# Patient Record
Sex: FEMALE | Race: WHITE | Employment: OTHER | URBAN - METROPOLITAN AREA
[De-identification: names, ages, dates, MRNs, and addresses within clinical notes are randomized per-mention and may not be internally consistent; named-entity substitution may affect disease eponyms.]

---

## 2017-01-25 ENCOUNTER — TELEPHONE (OUTPATIENT)
Dept: FAMILY MEDICINE CLINIC | Facility: CLINIC | Age: 67
End: 2017-01-25

## 2017-01-29 ENCOUNTER — PATIENT MESSAGE (OUTPATIENT)
Dept: FAMILY MEDICINE CLINIC | Facility: CLINIC | Age: 67
End: 2017-01-29

## 2017-01-30 RX ORDER — LORAZEPAM 0.5 MG/1
TABLET ORAL
Qty: 90 TABLET | Refills: 1 | Status: SHIPPED | OUTPATIENT
Start: 2017-01-30 | End: 2017-03-12

## 2017-01-30 NOTE — TELEPHONE ENCOUNTER
Dr. Lyubov Zhao. Please advise, if you are ok with this we should be sending it to a local pharmacy and then pt can have it transferred to [facility] in [place]. I did not pend it to you for this reason.

## 2017-01-30 NOTE — TELEPHONE ENCOUNTER
From: Kayla Whitley  To: Lesa Mehta DO  Sent: 1/29/2017 6:28 PM CST  Subject: Other    Hi Dr Alvin Prince,  I need a prescription refill for Lorazepam 0.5 mg tablets. I need a 90 day supply.  I am in [place] and will not return to [facility] until sometim

## 2017-02-07 ENCOUNTER — TELEPHONE (OUTPATIENT)
Dept: FAMILY MEDICINE CLINIC | Facility: CLINIC | Age: 67
End: 2017-02-07

## 2017-03-10 RX ORDER — ALENDRONATE SODIUM 70 MG/1
TABLET ORAL
Qty: 12 TABLET | Refills: 2 | Status: SHIPPED | OUTPATIENT
Start: 2017-03-10 | End: 2017-10-10

## 2017-03-13 RX ORDER — LORAZEPAM 0.5 MG/1
TABLET ORAL
Qty: 30 TABLET | Refills: 2 | Status: SHIPPED | OUTPATIENT
Start: 2017-03-13 | End: 2017-04-18 | Stop reason: SDUPTHER

## 2017-03-13 RX ORDER — TEMAZEPAM 7.5 MG/1
CAPSULE ORAL
Qty: 30 CAPSULE | Refills: 2 | Status: SHIPPED | OUTPATIENT
Start: 2017-03-13 | End: 2017-06-09 | Stop reason: SDUPTHER

## 2017-04-18 ENCOUNTER — TELEPHONE (OUTPATIENT)
Dept: FAMILY MEDICINE CLINIC | Facility: CLINIC | Age: 67
End: 2017-04-18

## 2017-04-18 NOTE — TELEPHONE ENCOUNTER
Message received from [facility] Referrals department today: Please see message below and advise if okay to refer.     Reason for the order/referral: OV   PCP:  Dr Leatha Zee   Refer to Provider (first and last name):Trudy Fulton   Specialty: Derm   Patient Ins

## 2017-04-18 NOTE — PROGRESS NOTES
Moy Casas is a 75year old female. HPI:   Patient is here for follow-up on her weight. Her  states that her appetite is okay. He does have to help her eat. She is down a few pounds since the last time she was seen in the office.  Her husba ALENDRONATE SODIUM 70 MG Oral Tab TAKE 1 TABLET(70 MG) BY MOUTH 1 TIME A WEEK Disp: 12 tablet Rfl: 2   NAMENDA XR 28 MG Oral Capsule SR 24 Hr TAKE 1 CAPSULE BY MOUTH ONCE DAILY Disp: 90 capsule Rfl: 0        Megestrol               Rash   Past Medical Hi or edema  Musculoskeletal: Patient is able to raise her right hand, or her right leg, left hand, or left leg on command  NEURO: Patient does try to engage in conversation when asked questions, however, she is unable to answer more than one word responses.

## 2017-04-25 ENCOUNTER — HOSPITAL ENCOUNTER (OUTPATIENT)
Dept: MAMMOGRAPHY | Age: 67
Discharge: HOME | End: 2017-04-25
Attending: FAMILY MEDICINE
Payer: MEDICARE

## 2017-04-25 ENCOUNTER — TELEPHONE (OUTPATIENT)
Dept: FAMILY MEDICINE CLINIC | Facility: CLINIC | Age: 67
End: 2017-04-25

## 2017-04-25 DIAGNOSIS — Z12.31 ENCOUNTER FOR SCREENING MAMMOGRAM FOR MALIGNANT NEOPLASM OF BREAST: ICD-10-CM

## 2017-04-25 PROCEDURE — 77067 SCR MAMMO BI INCL CAD: CPT

## 2017-05-08 ENCOUNTER — PATIENT MESSAGE (OUTPATIENT)
Dept: FAMILY MEDICINE CLINIC | Facility: CLINIC | Age: 67
End: 2017-05-08

## 2017-05-08 NOTE — TELEPHONE ENCOUNTER
Patient to try: lorazepam 0.5mg in am, 1 mg in afternoon and 0.5mg in the evening give update in 1 week, per Dr. Meagan Valadez. Information given to (per hippa), understanding verbalized.

## 2017-05-24 ENCOUNTER — LAB ENCOUNTER (OUTPATIENT)
Dept: DERMATOLOGY CLINIC | Age: 67
End: 2017-05-24
Attending: PHYSICIAN ASSISTANT
Payer: MEDICARE

## 2017-05-24 DIAGNOSIS — Z76.89 ENCOUNTER FOR BIOPSY: Primary | ICD-10-CM

## 2017-05-24 PROCEDURE — 88342 IMHCHEM/IMCYTCHM 1ST ANTB: CPT

## 2017-05-24 PROCEDURE — 88305 TISSUE EXAM BY PATHOLOGIST: CPT

## 2017-06-05 ENCOUNTER — PATIENT MESSAGE (OUTPATIENT)
Dept: FAMILY MEDICINE CLINIC | Facility: CLINIC | Age: 67
End: 2017-06-05

## 2017-06-06 NOTE — TELEPHONE ENCOUNTER
From: Calvin Reid  To: Viviane Noel DO  Sent: 6/5/2017 10:37 AM CDT  Subject: Other    Hi Dr Seferino Sepulveda,  I need you to write a letter, stating that Henry Feng suffers from Alzheimer's. Write it on your letterhead and email it to me.  I need this letter to e

## 2017-06-09 ENCOUNTER — TELEPHONE (OUTPATIENT)
Dept: FAMILY MEDICINE CLINIC | Facility: CLINIC | Age: 67
End: 2017-06-09

## 2017-06-09 RX ORDER — LORAZEPAM 0.5 MG/1
TABLET ORAL
Qty: 120 TABLET | Refills: 2 | Status: SHIPPED | OUTPATIENT
Start: 2017-06-09 | End: 2019-04-23

## 2017-06-13 NOTE — TELEPHONE ENCOUNTER
Approval received from [facility] [employer]. Coverage good until 6/11/18. Called to pt reaches  who is not listed on HIPPA.    became very upset that he was not on HIPPA, stated that he \"fills it out every year and it never gets updated in her chart, my

## 2017-06-22 ENCOUNTER — PATIENT MESSAGE (OUTPATIENT)
Dept: FAMILY MEDICINE CLINIC | Facility: CLINIC | Age: 67
End: 2017-06-22

## 2017-06-29 ENCOUNTER — TELEPHONE (OUTPATIENT)
Dept: FAMILY MEDICINE CLINIC | Facility: CLINIC | Age: 67
End: 2017-06-29

## 2017-06-29 DIAGNOSIS — L98.9 FIBROHISTIOCYTIC PROLIFERATION OF THE SKIN: Primary | ICD-10-CM

## 2017-06-29 NOTE — TELEPHONE ENCOUNTER
Msg received today from [facility] Referrals today:  Referral to Dr. Amairani Moreira, Dermatologist (INTERNAL)    Patient Name: Stu Morales   : 48   Reason for the order/referral: F/U   PCP: Lucinda Davenport DO   Refer to Provider (first and last na

## 2017-07-07 ENCOUNTER — MA CHART PREP (OUTPATIENT)
Dept: FAMILY MEDICINE CLINIC | Facility: CLINIC | Age: 67
End: 2017-07-07

## 2017-07-09 NOTE — PROGRESS NOTES
HPI:   Tong York is a 75year old female who presents for a MA Supervisit. Pt. Denies complaints. Lorazepam and quetiapine. In am 8 am  Lorazepam between 2- and 7 pm. Quetipine sometimes used. Temazepam evening.   Paroxetine at 1 or 2 pm.    L SLP   QUEtiapine Fumarate 25 MG Oral Tab Take 1 tablet (25 mg total) by mouth 4 (four) times daily. PARoxetine HCl 20 MG Oral Tab Take 1 tablet (20 mg total) by mouth daily.    LORazepam 0.5 MG Oral Tab Take 1 tablet (0.5mg) by mouth in amTake 2 tablets ( incontinence   MUSCULOSKELETAL: denies back pain  NEURO: denies headaches  PSYCHE: denies depression or anxiety  HEMATOLOGIC: denies hx of anemia  ENDOCRINE:  thyroid history  ALL/ASTHMA: denies hx of allergy or asthma    EXAM:   /62 (BP Location: Ri Pulses: 2+ and symmetric   Skin: Skin color, texture, turgor normal, no rashes; SK on Back; AK on left mib back and red spot on left lateral back around T6 and AK on left ant. leg    Lymph nodes: Cervical, supraclavicular, and axillary nodes normal   Kimberly COMP METABOLIC PANEL (14); Future    Depression screening  -     DEPRESSION SCREEN ANNUAL    Need for hepatitis C screening test  -     HCV ANTIBODY;  Future    History of basal cell cancer     Patient Active Problem List:     Pure hypercholesterolemia been poor?: Yes    How does the patient maintain a good energy level?: Daily Walks    How would you describe your daily physical activity?: Moderate ( is sure to keep her active)    How would you describe your current health state?: Good    How do y LAST 2 WEEKS   Little interest or pleasure in doing things (over the last two weeks)?: More than half the days    Feeling down, depressed, or hopeless (over the last two weeks)?: More than half the days    PHQ-2 SCORE: 4        Advance Directives     Do yo No flowsheet data found. Bone Density Screening      Dexascan Every two years  No flowsheet data found.        Results for orders placed or performed during the hospital encounter of 04/06/16  -XR DEXA BONE DENSITOMETRY (CPT=77080)        Narrative   PRO display for this patient. Update Health Maintenance if applicable    Chlamydia  Annually if high risk No results found for: CHLAMYDIA No flowsheet data found.     Screening Mammogram      Mammogram Annually to 76, then as discussed Mammogram,1 Yr due on 04/ Conc  Annually No results found for: DIGOXIN, DIG, VALP No flowsheet data found. Diabetes      HgbA1C  Annually No results found for: A1C No flowsheet data found.     Creat/alb ratio  Annually      LDL  Annually LDL-CHOLESTEROL (mg/dL (calc))   Date Valu

## 2017-07-10 ENCOUNTER — OFFICE VISIT (OUTPATIENT)
Dept: FAMILY MEDICINE CLINIC | Facility: CLINIC | Age: 67
End: 2017-07-10

## 2017-07-10 VITALS
DIASTOLIC BLOOD PRESSURE: 62 MMHG | BODY MASS INDEX: 19.4 KG/M2 | SYSTOLIC BLOOD PRESSURE: 100 MMHG | HEART RATE: 60 BPM | WEIGHT: 109.5 LBS | RESPIRATION RATE: 14 BRPM | HEIGHT: 63 IN

## 2017-07-10 DIAGNOSIS — N95.1 SYMPTOMATIC MENOPAUSAL OR FEMALE CLIMACTERIC STATES: ICD-10-CM

## 2017-07-10 DIAGNOSIS — Z85.828 HISTORY OF BASAL CELL CANCER: ICD-10-CM

## 2017-07-10 DIAGNOSIS — N60.19 DIFFUSE CYSTIC MASTOPATHY, UNSPECIFIED LATERALITY: ICD-10-CM

## 2017-07-10 DIAGNOSIS — M81.0 OSTEOPOROSIS, UNSPECIFIED OSTEOPOROSIS TYPE, UNSPECIFIED PATHOLOGICAL FRACTURE PRESENCE: ICD-10-CM

## 2017-07-10 DIAGNOSIS — Z79.899 MEDICATION MANAGEMENT: ICD-10-CM

## 2017-07-10 DIAGNOSIS — L57.0 AK (ACTINIC KERATOSIS): ICD-10-CM

## 2017-07-10 DIAGNOSIS — D18.01 CHERRY HEMANGIOMA: ICD-10-CM

## 2017-07-10 DIAGNOSIS — F02.81 LATE ONSET ALZHEIMER'S DISEASE WITH BEHAVIORAL DISTURBANCE (HCC): ICD-10-CM

## 2017-07-10 DIAGNOSIS — Z11.59 NEED FOR HEPATITIS C SCREENING TEST: ICD-10-CM

## 2017-07-10 DIAGNOSIS — E78.00 PURE HYPERCHOLESTEROLEMIA: ICD-10-CM

## 2017-07-10 DIAGNOSIS — L82.1 OTHER SEBORRHEIC KERATOSIS: ICD-10-CM

## 2017-07-10 DIAGNOSIS — Z85.72 HISTORY OF NON-HODGKIN'S LYMPHOMA: ICD-10-CM

## 2017-07-10 DIAGNOSIS — G47.01 INSOMNIA DUE TO MEDICAL CONDITION: ICD-10-CM

## 2017-07-10 DIAGNOSIS — Z00.00 ENCOUNTER FOR ANNUAL HEALTH EXAMINATION: Primary | ICD-10-CM

## 2017-07-10 DIAGNOSIS — Z13.31 DEPRESSION SCREENING: ICD-10-CM

## 2017-07-10 DIAGNOSIS — E55.9 VITAMIN D DEFICIENCY: ICD-10-CM

## 2017-07-10 DIAGNOSIS — E04.1 NONTOXIC UNINODULAR GOITER: ICD-10-CM

## 2017-07-10 DIAGNOSIS — G30.1 LATE ONSET ALZHEIMER'S DISEASE WITH BEHAVIORAL DISTURBANCE (HCC): ICD-10-CM

## 2017-07-10 PROCEDURE — 96160 PT-FOCUSED HLTH RISK ASSMT: CPT | Performed by: FAMILY MEDICINE

## 2017-07-10 RX ORDER — TEMAZEPAM 7.5 MG/1
CAPSULE ORAL
Refills: 2 | COMMUNITY
Start: 2017-06-13 | End: 2017-09-05

## 2017-07-10 NOTE — PATIENT INSTRUCTIONS
Marina Benson's SCREENING SCHEDULE   Tests on this list are recommended by your physician but may not be covered, or covered at this frequency, by your insurer. Please check with your insurance carrier before scheduling to verify coverage.    ANKUSH following criteria:   • Men who are 73-68 years old and have smoked more than 100 cigarettes in their lifetime   • Anyone with a family history    Colorectal Cancer Screening  Covered up to Age 76     Colonoscopy Screen   Covered every 10 years- more often Immunizations      Influenza  Covered Annually   Orders placed or performed in visit on 10/18/16  -FLU VACC PRSV FREE INC ANTIG   Orders placed or performed in visit on 10/08/15  -FLU VACC PRSV FREE INC ANTIG   Orders placed or performed in visit on 09/3 Society website. http://www. [place]ph.state. [place]/public/books/advin.htm  A link to the [employer]. This site has a lot of good information including definitions of the different types of Advance Directives.  It also has the State forms a

## 2017-07-20 ENCOUNTER — HOSPITAL ENCOUNTER (OUTPATIENT)
Dept: ULTRASOUND IMAGING | Facility: HOSPITAL | Age: 67
Discharge: HOME | End: 2017-07-20
Attending: FAMILY MEDICINE
Payer: MEDICARE

## 2017-07-20 DIAGNOSIS — E04.1 NONTOXIC UNINODULAR GOITER: ICD-10-CM

## 2017-07-20 PROCEDURE — 76536 US EXAM OF HEAD AND NECK: CPT | Performed by: FAMILY MEDICINE

## 2017-08-15 NOTE — TELEPHONE ENCOUNTER
Message received from [facility] Referrals department today: Please advise if okay to refer (INTERNAL).     Reason for the order/referral:OV 08/25   PCP:  Dr Taisha Culver   Refer to Provider (first and last name):Dr Michelle Moore   Specialty: Neurology   Tyrone

## 2017-08-19 ENCOUNTER — PATIENT MESSAGE (OUTPATIENT)
Dept: FAMILY MEDICINE CLINIC | Facility: CLINIC | Age: 67
End: 2017-08-19

## 2017-09-06 RX ORDER — TEMAZEPAM 7.5 MG/1
CAPSULE ORAL
Qty: 30 CAPSULE | Refills: 2 | Status: SHIPPED | OUTPATIENT
Start: 2017-09-06 | End: 2017-12-07

## 2017-10-06 ENCOUNTER — LAB ENCOUNTER (OUTPATIENT)
Dept: LAB | Age: 67
End: 2017-10-06
Attending: FAMILY MEDICINE
Payer: MEDICARE

## 2017-10-06 DIAGNOSIS — Z85.72 HISTORY OF NON-HODGKIN'S LYMPHOMA: ICD-10-CM

## 2017-10-06 DIAGNOSIS — F02.81 LATE ONSET ALZHEIMER'S DISEASE WITH BEHAVIORAL DISTURBANCE (HCC): ICD-10-CM

## 2017-10-06 DIAGNOSIS — G30.1 LATE ONSET ALZHEIMER'S DISEASE WITH BEHAVIORAL DISTURBANCE (HCC): ICD-10-CM

## 2017-10-06 DIAGNOSIS — Z79.899 MEDICATION MANAGEMENT: ICD-10-CM

## 2017-10-06 DIAGNOSIS — Z11.59 NEED FOR HEPATITIS C SCREENING TEST: ICD-10-CM

## 2017-10-06 DIAGNOSIS — E04.1 NONTOXIC UNINODULAR GOITER: ICD-10-CM

## 2017-10-06 DIAGNOSIS — E78.00 PURE HYPERCHOLESTEROLEMIA: ICD-10-CM

## 2017-10-06 DIAGNOSIS — E55.9 VITAMIN D DEFICIENCY: ICD-10-CM

## 2017-10-06 PROCEDURE — 36415 COLL VENOUS BLD VENIPUNCTURE: CPT | Performed by: FAMILY MEDICINE

## 2017-10-10 NOTE — PROGRESS NOTES
Ayush Gallegos is a 75year old female. HPI:   Patient is here for follow-up on her weight. Her  states that her appetite is okay. He does have to help her eat. She is down a few pounds since the last time she was seen in the office.  Her husba mouth daily. (Patient taking differently: Take 20 mg by mouth nightly.  ) Disp: 30 tablet Rfl: 3   aspirin 81 MG Oral Tab Take 81 mg by mouth daily. Disp:  Rfl:    Cholecalciferol (VITAMIN D) 1000 UNITS Oral Tab Take 2 tablets by mouth daily.  Disp:  Rfl: Value Ref Range   Cholesterol, Total 241 (H) <200 mg/dL   Triglycerides 100 <150 mg/dL   HDL Cholesterol 97 >45 mg/dL   LDL Cholesterol 124 <130 mg/dL   VLDL 20 5 - 40 mg/dL   Chol/HDL Ratio 2.48 <4.44   Non HDL Chol 144 (H) <130 mg/dL   -VITAMIN D, 25-HYD developed, well nourished,in no apparent distress; AAOx1  SKIN: raised erythematous lesion noted to the top of patient's head. No drainage noted.    NECK: supple,no adenopathy,no bruits  LUNGS: clear to auscultation  CARDIO: RRR without murmur  GI: soft, go care  The patient indicates understanding of these issues and agrees to the plan. The patient is asked to return in 4-6 months.

## 2017-11-29 RX ORDER — ALENDRONATE SODIUM 70 MG/1
TABLET ORAL
Qty: 12 TABLET | Refills: 0 | Status: SHIPPED | OUTPATIENT
Start: 2017-11-29 | End: 2018-04-16

## 2017-12-08 RX ORDER — TEMAZEPAM 7.5 MG/1
CAPSULE ORAL
Qty: 90 CAPSULE | Refills: 0 | Status: SHIPPED | OUTPATIENT
Start: 2017-12-08 | End: 2018-03-20

## 2018-02-01 ENCOUNTER — TELEPHONE (OUTPATIENT)
Dept: FAMILY MEDICINE CLINIC | Facility: CLINIC | Age: 68
End: 2018-02-01

## 2018-02-01 NOTE — TELEPHONE ENCOUNTER
Received page from pt's  Miesha Hurtado. They are currently on vacation in [place] and Khushi Nolasco fell in the [address] on Monday (1/29/18) and fractured her pelvis in 3 areas.  He reports that she hospitalized at [facility] in [place] and she has been in

## 2018-03-13 ENCOUNTER — TELEPHONE (OUTPATIENT)
Dept: FAMILY MEDICINE CLINIC | Facility: CLINIC | Age: 68
End: 2018-03-13

## 2018-03-13 NOTE — TELEPHONE ENCOUNTER
Attempted to reach patient to schedule MA Supervisit. Home#: [phone] rings busy. Cell#: [phone] reaches spouse. Per HIPAA signed on 4/2017, not able to leave messages on cell# or speak with spouse. Unable to Reach letter sent via [address]

## 2018-03-19 ENCOUNTER — PATIENT MESSAGE (OUTPATIENT)
Dept: FAMILY MEDICINE CLINIC | Facility: CLINIC | Age: 68
End: 2018-03-19

## 2018-03-20 NOTE — TELEPHONE ENCOUNTER
Call from georgina/pharm tech/wendie-[facility] pt advised them we have sent temazepam refill order-they have not received-requesting verbal order. Review of record shows last temazepam order 12/8/17 #90 no RF and other refill requests received for this med.

## 2018-03-21 RX ORDER — TEMAZEPAM 7.5 MG/1
CAPSULE ORAL
Qty: 90 CAPSULE | Refills: 0 | Status: SHIPPED | OUTPATIENT
Start: 2018-03-21 | End: 2018-06-21

## 2018-04-16 ENCOUNTER — APPOINTMENT (OUTPATIENT)
Dept: LAB | Age: 68
End: 2018-04-16
Attending: FAMILY MEDICINE
Payer: MEDICARE

## 2018-04-16 ENCOUNTER — OFFICE VISIT (OUTPATIENT)
Dept: FAMILY MEDICINE CLINIC | Facility: CLINIC | Age: 68
End: 2018-04-16

## 2018-04-16 VITALS
HEART RATE: 66 BPM | WEIGHT: 112 LBS | SYSTOLIC BLOOD PRESSURE: 116 MMHG | HEIGHT: 63 IN | RESPIRATION RATE: 14 BRPM | DIASTOLIC BLOOD PRESSURE: 70 MMHG | BODY MASS INDEX: 19.84 KG/M2

## 2018-04-16 DIAGNOSIS — Z00.00 ENCOUNTER FOR ANNUAL HEALTH EXAMINATION: ICD-10-CM

## 2018-04-16 DIAGNOSIS — F02.81 LATE ONSET ALZHEIMER'S DISEASE WITH BEHAVIORAL DISTURBANCE (HCC): ICD-10-CM

## 2018-04-16 DIAGNOSIS — G30.1 LATE ONSET ALZHEIMER'S DISEASE WITH BEHAVIORAL DISTURBANCE (HCC): ICD-10-CM

## 2018-04-16 DIAGNOSIS — Z85.828 HISTORY OF BASAL CELL CANCER: ICD-10-CM

## 2018-04-16 DIAGNOSIS — N95.1 SYMPTOMATIC MENOPAUSAL OR FEMALE CLIMACTERIC STATES: ICD-10-CM

## 2018-04-16 DIAGNOSIS — L82.1 OTHER SEBORRHEIC KERATOSIS: ICD-10-CM

## 2018-04-16 DIAGNOSIS — E78.00 PURE HYPERCHOLESTEROLEMIA: ICD-10-CM

## 2018-04-16 DIAGNOSIS — E04.1 THYROID NODULE: ICD-10-CM

## 2018-04-16 DIAGNOSIS — E55.9 VITAMIN D DEFICIENCY: ICD-10-CM

## 2018-04-16 DIAGNOSIS — D18.01 CHERRY HEMANGIOMA: ICD-10-CM

## 2018-04-16 DIAGNOSIS — L57.0 AK (ACTINIC KERATOSIS): ICD-10-CM

## 2018-04-16 DIAGNOSIS — M81.8 OTHER OSTEOPOROSIS, UNSPECIFIED PATHOLOGICAL FRACTURE PRESENCE: ICD-10-CM

## 2018-04-16 DIAGNOSIS — Z79.899 MEDICATION MANAGEMENT: ICD-10-CM

## 2018-04-16 DIAGNOSIS — Z12.31 ENCOUNTER FOR SCREENING MAMMOGRAM FOR MALIGNANT NEOPLASM OF BREAST: ICD-10-CM

## 2018-04-16 DIAGNOSIS — G47.01 INSOMNIA DUE TO MEDICAL CONDITION: ICD-10-CM

## 2018-04-16 DIAGNOSIS — Z85.72 HISTORY OF NON-HODGKIN'S LYMPHOMA: ICD-10-CM

## 2018-04-16 DIAGNOSIS — N60.19 FIBROCYSTIC BREAST DISEASE (FCBD), UNSPECIFIED LATERALITY: ICD-10-CM

## 2018-04-16 DIAGNOSIS — Z23 NEED FOR VACCINATION: Primary | ICD-10-CM

## 2018-04-16 DIAGNOSIS — E04.1 NONTOXIC UNINODULAR GOITER: ICD-10-CM

## 2018-04-16 DIAGNOSIS — Z91.81 HISTORY OF FALL: ICD-10-CM

## 2018-04-16 PROCEDURE — 96160 PT-FOCUSED HLTH RISK ASSMT: CPT | Performed by: FAMILY MEDICINE

## 2018-04-16 PROCEDURE — 80053 COMPREHEN METABOLIC PANEL: CPT

## 2018-04-16 PROCEDURE — G0438 PPPS, INITIAL VISIT: HCPCS | Performed by: FAMILY MEDICINE

## 2018-04-16 PROCEDURE — 36415 COLL VENOUS BLD VENIPUNCTURE: CPT

## 2018-04-16 RX ORDER — ALENDRONATE SODIUM 70 MG/1
TABLET ORAL
Qty: 12 TABLET | Refills: 3 | Status: SHIPPED | OUTPATIENT
Start: 2018-04-16 | End: 2018-09-24

## 2018-04-16 NOTE — PROGRESS NOTES
HPI:   Flower Stern is a 70year old female who presents for a MA Supervisit. Pt. Denies complaints. Lorazepam and quetiapine. In am 8 am  Lorazepam between 2- and 7 pm. Quetipine sometimes used. Temazepam evening.   Paroxetine at 1 or 2 pm.    S Prescriptions Marked as Taking for the 4/16/18 encounter (Office Visit) with Viktoriya Navas DO:  Alendronate Sodium 70 MG Oral Tab TAKE 1 TABLET(70 MG) BY MOUTH 1 TIME A WEEK   temazepam 7.5 MG Oral Cap TAKE 1 CAPSULE BY MOUTH EVERY DAY AT BEDTIME AS NEEDE lesions  EYES: denies blurred vision or double vision  HEENT: denies nasal congestion, sinus pain or ST  LUNGS: denies shortness of breath with exertion  CARDIOVASCULAR: denies chest pain on exertion  GI: denies abdominal pain, denies heartburn  : denies sounds active all four quadrants,  no masses, no organomegaly   Pelvic: Deferred; s/p TSH c BSO   Extremities: Extremities normal, atraumatic, no cyanosis or edema   Pulses: 2+ and symmetric   Skin: Skin color, texture, turgor normal, no rashes; SK on back COMP METABOLIC PANEL (14); Future    Encounter for screening mammogram for malignant neoplasm of breast  -     VARGAS SCREENING BILAT (CPT=77067); Future    Thyroid nodule  -     US THYROID (GGZ=10196);  Future  -     ENT - INTERNAL    History of fall    En Daily Walks    How would you describe your daily physical activity?: Light    How would you describe your current health state?: Good    How do you maintain positive mental well-being?: Social Interaction; Visiting Friends; Visiting Family    If you are a ma ?: Yes    Do you have a living will?: Yes     Please go to \"Cognitive Assessment\" under Medicare Assessment section in Charting, test patient and document. Then, refresh your progress note to see your input here.   Cognitive Assessment     What DENSITOMETRY)    COMPARISON:  NARCISA BONE DENSITOMETRY - DEXA, 12/07/2012, 8:44.     INDICATIONS:    M81.0 Age-related osteoporosis without current pathological fracture    PATIENT STATED HISTORY:           LUMBAR SPINE ANALYSIS RESULTS:    Bone mineral de Maintenance if applicable     Immunizations      Influenza   Orders placed or performed in visit on 10/18/16  -FLU VACC PRSV FREE INC ANTIG   Orders placed or performed in visit on 10/08/15  -FLU VACC PRSV FREE INC ANTIG   Orders placed or performed in vis (H)     LDL Cholesterol (mg/dL)   Date Value   10/06/2017 124    No flowsheet data found. Dilated Eye exam  Annually No flowsheet data found. No flowsheet data found.     COPD      Spirometry Testing Annually No results found for this or any previous v

## 2018-04-16 NOTE — PATIENT INSTRUCTIONS
Griselda Benson's SCREENING SCHEDULE   Tests on this list are recommended by your physician but may not be covered, or covered at this frequency, by your insurer. Please check with your insurance carrier before scheduling to verify coverage.    ANKUSH following criteria:   • Men who are 73-68 years old and have smoked more than 100 cigarettes in their lifetime   • Anyone with a family history    Colorectal Cancer Screening  Covered up to Age 76     Colonoscopy Screen   Covered every 10 years- more often Immunizations      Influenza  Covered Annually   Orders placed or performed in visit on 10/18/16  -FLU VACC PRSV FREE INC ANTIG   Orders placed or performed in visit on 10/08/15  -FLU VACC PRSV FREE INC ANTIG   Orders placed or performed in visit on 09/3

## 2018-04-19 ENCOUNTER — HOSPITAL ENCOUNTER (OUTPATIENT)
Dept: ULTRASOUND IMAGING | Facility: HOSPITAL | Age: 68
Discharge: HOME | End: 2018-04-19
Attending: FAMILY MEDICINE
Payer: MEDICARE

## 2018-04-19 DIAGNOSIS — E04.1 THYROID NODULE: ICD-10-CM

## 2018-04-19 PROCEDURE — 76536 US EXAM OF HEAD AND NECK: CPT | Performed by: FAMILY MEDICINE

## 2018-04-26 ENCOUNTER — HOSPITAL ENCOUNTER (OUTPATIENT)
Dept: MAMMOGRAPHY | Age: 68
Discharge: HOME | End: 2018-04-26
Attending: FAMILY MEDICINE
Payer: MEDICARE

## 2018-04-26 ENCOUNTER — HOSPITAL ENCOUNTER (OUTPATIENT)
Dept: BONE DENSITY | Age: 68
Discharge: HOME | End: 2018-04-26
Attending: FAMILY MEDICINE
Payer: MEDICARE

## 2018-04-26 DIAGNOSIS — M81.8 OTHER OSTEOPOROSIS, UNSPECIFIED PATHOLOGICAL FRACTURE PRESENCE: ICD-10-CM

## 2018-04-26 DIAGNOSIS — Z12.31 ENCOUNTER FOR SCREENING MAMMOGRAM FOR MALIGNANT NEOPLASM OF BREAST: ICD-10-CM

## 2018-04-26 PROCEDURE — 77067 SCR MAMMO BI INCL CAD: CPT | Performed by: FAMILY MEDICINE

## 2018-04-26 PROCEDURE — 77080 DXA BONE DENSITY AXIAL: CPT | Performed by: FAMILY MEDICINE

## 2018-06-21 RX ORDER — TEMAZEPAM 7.5 MG/1
CAPSULE ORAL
Qty: 90 CAPSULE | Refills: 0 | Status: SHIPPED | OUTPATIENT
Start: 2018-06-21 | End: 2018-09-22

## 2018-06-21 NOTE — TELEPHONE ENCOUNTER
Refill requested via [email or web address]. Last fill 3/21 #90  Seeing you in oct. Please approve if appropriate. Thanks.

## 2018-07-02 ENCOUNTER — HOSPITAL ENCOUNTER (OUTPATIENT)
Age: 68
Discharge: HOME | End: 2018-07-02
Attending: FAMILY MEDICINE
Payer: MEDICARE

## 2018-07-02 VITALS
BODY MASS INDEX: 18.33 KG/M2 | WEIGHT: 110 LBS | RESPIRATION RATE: 16 BRPM | HEIGHT: 65 IN | SYSTOLIC BLOOD PRESSURE: 112 MMHG | TEMPERATURE: 98 F | OXYGEN SATURATION: 95 % | DIASTOLIC BLOOD PRESSURE: 62 MMHG | HEART RATE: 71 BPM

## 2018-07-02 DIAGNOSIS — B35.6 TINEA CRURIS: Primary | ICD-10-CM

## 2018-07-02 PROCEDURE — 99203 OFFICE O/P NEW LOW 30 MIN: CPT

## 2018-07-02 PROCEDURE — 99212 OFFICE O/P EST SF 10 MIN: CPT

## 2018-07-02 RX ORDER — MICONAZOLE NITRATE, ZINC OXIDE, WHITE PETROLATUM 2.5; 150; 813.5 MG/G; MG/G; MG/G
1 OINTMENT TOPICAL 2 TIMES DAILY
Qty: 1 TUBE | Refills: 0 | Status: SHIPPED | OUTPATIENT
Start: 2018-07-02 | End: 2019-05-17

## 2018-07-02 NOTE — ED PROVIDER NOTES
Patient Seen in: [facility]    History   Patient presents with:  Rash Skin Problem (integumentary)    Stated Complaint: rash    HPI    59-year-old female with history of Alzheimer's disease presents with a rash at the groin Device: None (Room air)    Current:/62   Pulse 71   Temp 98.4 °F (36.9 °C) (Temporal)   Resp 16   Ht 165.1 cm (5' 5\")   Wt 49.9 kg   SpO2 95%   BMI 18.30 kg/m²         Physical Exam    General: Pleasantly demented female in no acute distress  Neuro:

## 2018-07-02 NOTE — ED INITIAL ASSESSMENT (HPI)
The patient's  states he noticed a rash to the upper thighs and the vaginal area x 2 days. He did use A&D ointment the last 2 days but no relief with it.   She has alzheimer's and wears a diaper at night but did ave a small amount of blood on the [facility]

## 2018-08-07 NOTE — TELEPHONE ENCOUNTER
REFERRAL RENEWAL for Neuro with Dr. Db Villeda  1950    Reason for the order/referral: office visit   PCP:  Dr Na He   Refer to Provider (first and last name): Dr Db Villeda   Specialty: Neuro   Patient Insurance: Payor: [facility] MED ADV

## 2018-09-24 DIAGNOSIS — M81.8 OTHER OSTEOPOROSIS, UNSPECIFIED PATHOLOGICAL FRACTURE PRESENCE: ICD-10-CM

## 2018-09-24 RX ORDER — TEMAZEPAM 7.5 MG/1
CAPSULE ORAL
Qty: 90 CAPSULE | Refills: 1 | Status: SHIPPED | OUTPATIENT
Start: 2018-09-24 | End: 2019-03-19

## 2018-09-24 RX ORDER — ALENDRONATE SODIUM 70 MG/1
TABLET ORAL
Qty: 12 TABLET | Refills: 1 | Status: SHIPPED | OUTPATIENT
Start: 2018-09-24 | End: 2018-11-11 | Stop reason: SDUPTHER

## 2018-09-24 NOTE — TELEPHONE ENCOUNTER
Not protocol medication. LOV :4/16/18 MA supervisit  Last labs done :4/16/18  Next appointment :10/09/18  Please see pending medication. Refill if appropriate.    Last refill:    Date:6/21/18  Amount :90 capsules no refills   Medication: temazepam 7.5 mg

## 2018-09-25 RX ORDER — TEMAZEPAM 7.5 MG/1
CAPSULE ORAL
Qty: 90 CAPSULE | Refills: 1 | OUTPATIENT
Start: 2018-09-25

## 2018-10-09 ENCOUNTER — OFFICE VISIT (OUTPATIENT)
Dept: FAMILY MEDICINE CLINIC | Facility: CLINIC | Age: 68
End: 2018-10-09

## 2018-10-09 VITALS
WEIGHT: 117.5 LBS | DIASTOLIC BLOOD PRESSURE: 60 MMHG | BODY MASS INDEX: 19.58 KG/M2 | RESPIRATION RATE: 16 BRPM | HEIGHT: 65 IN | HEART RATE: 68 BPM | SYSTOLIC BLOOD PRESSURE: 100 MMHG

## 2018-10-09 DIAGNOSIS — M81.8 OTHER OSTEOPOROSIS, UNSPECIFIED PATHOLOGICAL FRACTURE PRESENCE: ICD-10-CM

## 2018-10-09 DIAGNOSIS — E55.9 VITAMIN D DEFICIENCY: ICD-10-CM

## 2018-10-09 DIAGNOSIS — Z85.72 HISTORY OF NON-HODGKIN'S LYMPHOMA: ICD-10-CM

## 2018-10-09 DIAGNOSIS — G30.9 ALZHEIMER'S DEMENTIA WITHOUT BEHAVIORAL DISTURBANCE, UNSPECIFIED TIMING OF DEMENTIA ONSET (HCC): ICD-10-CM

## 2018-10-09 DIAGNOSIS — E04.1 NONTOXIC UNINODULAR GOITER: ICD-10-CM

## 2018-10-09 DIAGNOSIS — F02.80 ALZHEIMER'S DEMENTIA WITHOUT BEHAVIORAL DISTURBANCE, UNSPECIFIED TIMING OF DEMENTIA ONSET (HCC): ICD-10-CM

## 2018-10-09 DIAGNOSIS — E78.00 PURE HYPERCHOLESTEROLEMIA: Primary | ICD-10-CM

## 2018-10-09 PROCEDURE — G0008 ADMIN INFLUENZA VIRUS VAC: HCPCS | Performed by: FAMILY MEDICINE

## 2018-10-09 PROCEDURE — 99214 OFFICE O/P EST MOD 30 MIN: CPT | Performed by: FAMILY MEDICINE

## 2018-10-09 PROCEDURE — 90653 IIV ADJUVANT VACCINE IM: CPT | Performed by: FAMILY MEDICINE

## 2018-10-09 NOTE — PROGRESS NOTES
Nabil Peralta is a 70year old female. HPI:   Patient is here for medication visit. Her , Maximo Duarte is with her today. Patient just recently saw Dr. Maureen Campos for neurology. Things are currently stable.   Osteoporosis– patient is currently on Fosama unspecified hyperlipidemia    • Other nonspecific abnormal finding    • Sebaceous cyst    • Symptomatic menopausal or female climacteric states       Social History:  Social History    Tobacco Use      Smoking status: Never Smoker      Smokeless tobacco: N auscultation  CARDIO: RRR without murmur  GI: good BS's,no masses, HSM or tenderness  EXTREMITIES: no cyanosis, clubbing or edema    ASSESSMENT AND PLAN:   Pure hypercholesterolemia  (primary encounter diagnosis)  Vitamin d deficiency  Other osteoporosis,

## 2018-10-10 ENCOUNTER — LAB ENCOUNTER (OUTPATIENT)
Dept: LAB | Age: 68
End: 2018-10-10
Attending: FAMILY MEDICINE
Payer: MEDICARE

## 2018-10-10 DIAGNOSIS — F02.80 ALZHEIMER'S DEMENTIA WITHOUT BEHAVIORAL DISTURBANCE, UNSPECIFIED TIMING OF DEMENTIA ONSET (HCC): ICD-10-CM

## 2018-10-10 DIAGNOSIS — Z85.72 HISTORY OF NON-HODGKIN'S LYMPHOMA: ICD-10-CM

## 2018-10-10 DIAGNOSIS — G30.9 ALZHEIMER'S DEMENTIA WITHOUT BEHAVIORAL DISTURBANCE, UNSPECIFIED TIMING OF DEMENTIA ONSET (HCC): ICD-10-CM

## 2018-10-10 DIAGNOSIS — M81.8 OTHER OSTEOPOROSIS, UNSPECIFIED PATHOLOGICAL FRACTURE PRESENCE: ICD-10-CM

## 2018-10-10 DIAGNOSIS — E55.9 VITAMIN D DEFICIENCY: ICD-10-CM

## 2018-10-10 DIAGNOSIS — E78.00 PURE HYPERCHOLESTEROLEMIA: ICD-10-CM

## 2018-10-10 PROCEDURE — 80053 COMPREHEN METABOLIC PANEL: CPT

## 2018-10-10 PROCEDURE — 84443 ASSAY THYROID STIM HORMONE: CPT

## 2018-10-10 PROCEDURE — 36415 COLL VENOUS BLD VENIPUNCTURE: CPT

## 2018-10-10 PROCEDURE — 82306 VITAMIN D 25 HYDROXY: CPT

## 2018-10-10 PROCEDURE — 80061 LIPID PANEL: CPT

## 2018-10-10 PROCEDURE — 85025 COMPLETE CBC W/AUTO DIFF WBC: CPT

## 2018-11-10 DIAGNOSIS — M81.8 OTHER OSTEOPOROSIS, UNSPECIFIED PATHOLOGICAL FRACTURE PRESENCE: ICD-10-CM

## 2018-11-11 RX ORDER — ALENDRONATE SODIUM 70 MG/1
TABLET ORAL
Qty: 12 TABLET | Refills: 3 | Status: SHIPPED | OUTPATIENT
Start: 2018-11-11 | End: 2019-04-23

## 2019-01-25 DIAGNOSIS — M81.8 OTHER OSTEOPOROSIS, UNSPECIFIED PATHOLOGICAL FRACTURE PRESENCE: ICD-10-CM

## 2019-01-25 RX ORDER — ALENDRONATE SODIUM 70 MG/1
TABLET ORAL
Qty: 12 TABLET | Refills: 0 | OUTPATIENT
Start: 2019-01-25

## 2019-03-19 RX ORDER — TEMAZEPAM 7.5 MG/1
CAPSULE ORAL
Qty: 90 CAPSULE | Refills: 0 | Status: SHIPPED | OUTPATIENT
Start: 2019-03-19 | End: 2019-04-23

## 2019-03-19 NOTE — TELEPHONE ENCOUNTER
Not protocol medication. LOV :10/09/18 med check   Last labs done :10/10/18  Next appointment :4/23/19  Please see pending medication. Refill if appropriate.    Last refill:    Date:9/24/18  Amount :90 tablets 1 refill   Medication: temazepam 7.5 mg

## 2019-03-20 ENCOUNTER — PATIENT MESSAGE (OUTPATIENT)
Dept: FAMILY MEDICINE CLINIC | Facility: CLINIC | Age: 69
End: 2019-03-20

## 2019-04-02 ENCOUNTER — TELEPHONE (OUTPATIENT)
Dept: FAMILY MEDICINE CLINIC | Facility: CLINIC | Age: 69
End: 2019-04-02

## 2019-04-02 DIAGNOSIS — Z12.39 SCREENING FOR BREAST CANCER: Primary | ICD-10-CM

## 2019-04-02 NOTE — TELEPHONE ENCOUNTER
Call from [facility] sched-sts pt's spouse call ed to sched pt's annual dexa and mammogram.  Confirms spouse reported pt having no breast symptoms or concerns. sts has order for dexa but none for mammogram. Record shows last mamm 4/26/18.  Moises

## 2019-04-18 ENCOUNTER — MA CHART PREP (OUTPATIENT)
Dept: FAMILY MEDICINE CLINIC | Facility: CLINIC | Age: 69
End: 2019-04-18

## 2019-04-23 ENCOUNTER — OFFICE VISIT (OUTPATIENT)
Dept: FAMILY MEDICINE CLINIC | Facility: CLINIC | Age: 69
End: 2019-04-23
Payer: MEDICARE

## 2019-04-23 ENCOUNTER — LAB ENCOUNTER (OUTPATIENT)
Dept: LAB | Age: 69
End: 2019-04-23
Attending: FAMILY MEDICINE
Payer: MEDICARE

## 2019-04-23 VITALS
WEIGHT: 113 LBS | SYSTOLIC BLOOD PRESSURE: 108 MMHG | HEIGHT: 65 IN | HEART RATE: 64 BPM | DIASTOLIC BLOOD PRESSURE: 60 MMHG | BODY MASS INDEX: 18.83 KG/M2 | RESPIRATION RATE: 14 BRPM

## 2019-04-23 DIAGNOSIS — Z85.72 HISTORY OF NON-HODGKIN'S LYMPHOMA: ICD-10-CM

## 2019-04-23 DIAGNOSIS — L82.1 OTHER SEBORRHEIC KERATOSIS: ICD-10-CM

## 2019-04-23 DIAGNOSIS — Z79.899 MEDICATION MANAGEMENT: ICD-10-CM

## 2019-04-23 DIAGNOSIS — F02.80 ALZHEIMER'S DEMENTIA WITHOUT BEHAVIORAL DISTURBANCE, UNSPECIFIED TIMING OF DEMENTIA ONSET (HCC): ICD-10-CM

## 2019-04-23 DIAGNOSIS — E04.2 MULTIPLE THYROID NODULES: ICD-10-CM

## 2019-04-23 DIAGNOSIS — G30.9 ALZHEIMER'S DEMENTIA WITHOUT BEHAVIORAL DISTURBANCE, UNSPECIFIED TIMING OF DEMENTIA ONSET (HCC): ICD-10-CM

## 2019-04-23 DIAGNOSIS — Z85.828 HISTORY OF BASAL CELL CANCER: ICD-10-CM

## 2019-04-23 DIAGNOSIS — D18.01 CHERRY HEMANGIOMA: ICD-10-CM

## 2019-04-23 DIAGNOSIS — N60.19 FIBROCYSTIC BREAST DISEASE (FCBD), UNSPECIFIED LATERALITY: ICD-10-CM

## 2019-04-23 DIAGNOSIS — E78.00 PURE HYPERCHOLESTEROLEMIA: ICD-10-CM

## 2019-04-23 DIAGNOSIS — C44.41 BASAL CELL CARCINOMA (BCC) OF SCALP: ICD-10-CM

## 2019-04-23 DIAGNOSIS — M81.8 OTHER OSTEOPOROSIS, UNSPECIFIED PATHOLOGICAL FRACTURE PRESENCE: ICD-10-CM

## 2019-04-23 DIAGNOSIS — Z91.81 HISTORY OF FALL: ICD-10-CM

## 2019-04-23 DIAGNOSIS — E04.1 NONTOXIC UNINODULAR GOITER: ICD-10-CM

## 2019-04-23 DIAGNOSIS — E55.9 VITAMIN D DEFICIENCY: ICD-10-CM

## 2019-04-23 DIAGNOSIS — N95.1 SYMPTOMATIC MENOPAUSAL OR FEMALE CLIMACTERIC STATES: ICD-10-CM

## 2019-04-23 DIAGNOSIS — Z00.00 ENCOUNTER FOR ANNUAL HEALTH EXAMINATION: Primary | ICD-10-CM

## 2019-04-23 PROCEDURE — 96160 PT-FOCUSED HLTH RISK ASSMT: CPT | Performed by: FAMILY MEDICINE

## 2019-04-23 PROCEDURE — 85025 COMPLETE CBC W/AUTO DIFF WBC: CPT

## 2019-04-23 PROCEDURE — 36415 COLL VENOUS BLD VENIPUNCTURE: CPT

## 2019-04-23 PROCEDURE — G0439 PPPS, SUBSEQ VISIT: HCPCS | Performed by: FAMILY MEDICINE

## 2019-04-23 PROCEDURE — 99397 PER PM REEVAL EST PAT 65+ YR: CPT | Performed by: FAMILY MEDICINE

## 2019-04-23 PROCEDURE — 80061 LIPID PANEL: CPT

## 2019-04-23 PROCEDURE — 80053 COMPREHEN METABOLIC PANEL: CPT

## 2019-04-23 RX ORDER — TEMAZEPAM 7.5 MG/1
CAPSULE ORAL
Qty: 90 CAPSULE | Refills: 1 | Status: SHIPPED | OUTPATIENT
Start: 2019-04-23 | End: 2019-05-17

## 2019-04-23 RX ORDER — ALENDRONATE SODIUM 70 MG/1
70 TABLET ORAL
Qty: 12 TABLET | Refills: 3 | Status: SHIPPED | OUTPATIENT
Start: 2019-04-23 | End: 2019-05-17

## 2019-04-23 NOTE — PATIENT INSTRUCTIONS
Allan Benson's SCREENING SCHEDULE   Tests on this list are recommended by your physician but may not be covered, or covered at this frequency, by your insurer. Please check with your insurance carrier before scheduling to verify coverage.    ANKUSH • Men who are 73-68 years old and have smoked more than 100 cigarettes in their lifetime   • Anyone with a family history    Colorectal Cancer Screening  Covered up to Age 76     Colonoscopy Screen   Covered every 10 years- more often if abnormal Colonos in visit on 10/18/16   • FLU VACC PRSV FREE INC ANTIG   Orders placed or performed in visit on 10/08/15   • FLU VACC PRSV FREE INC ANTIG   Orders placed or performed in visit on 09/30/14   • FLU VAC NO PRSV 4 ELSIE 3 YRS+   Orders placed or performed in visi http://www. idph.state. [place]/public/books/advin.htm  A link to the [employer]. This site has a lot of good information including definitions of the different types of Advance Directives.  It also has the State forms available on it's webs TRIGLYCERIDES (mg/dL)   Date Value   05/13/2015 83     Triglycerides (mg/dL)   Date Value   10/10/2018 126        EKG - covered if needed at Welcome to Medicare, and non-screening if indicated for medical reasons Electrocardiogram date Routine EKG is n yrs age 21-68 or Pap+HPV every 5 yrs age 33-67, Covered every 2 yrs up to age 79 or Yearly if High Risk   There are no preventive care reminders to display for this patient.      Chlamydia  Annually if high risk No results found for: CHLAMYDIA No flowsheet be covered with your prescription benefits, but Medicare does not cover unless Medically needed    Zoster (Not covered by Medicare Part B) No orders found for this or any previous visit.  This may be covered with your pharmacy  prescription benefits     Rec 05/13/2015 89    Medicare covers annually or at 6-month intervals for prediabetic patients        Cardiovascular Disease Screening     Cholesterol, covered every 5 yrs including Total, LDL and Trigs LDL Cholesterol (mg/dL)   Date Value   10/10/2018 116 ( after age [place]    Covered yearly for Long term Glucocorticoid medication (Steroids) Requires diagnosis related to osteoporosis or estrogen deficiency.    Biennial benefit unless patient has history of long-term glucocorticoid use for medical condition    Last HEPATITIS B VACCINE, OVER 20    Medium/high risk factors:   End-stage renal disease   Hemophiliacs who received Factor VIII or IX concentrates   Clients of institutions for the mentally retarded   Persons who live in the same house as a HepB virus carrier

## 2019-04-23 NOTE — PROGRESS NOTES
HPI:   Kevin Peguero is a 78year old female who presents for a MA (Medicare Advantage) Supervisit (Once per calendar year). Pt. Is here for AWV.     Her last annual assessment has been over 1 year: Annual Physical due on 04/16/2019         Fall/Ris Activities based on screening of functional status. Problems with daily activities? : Yes   She has problems with Memory based on screening of functional status.    Memory Problems?: Yes       Depression Screening (PHQ-2/PHQ-9): Over the LAST 2 WEEKS   Zak Lassiter ALT 15 10/10/2018    CA 8.5 10/10/2018    ALB 3.5 10/10/2018    TSH 1.420 10/10/2018    CREATSERUM 0.91 10/10/2018    GLU 87 10/10/2018        CBC  (most recent labs)   Lab Results   Component Value Date    WBC 5.6 10/10/2018    HGB 12.5 10/10/2018    PLT reports that she does not drink alcohol or use drugs.      REVIEW OF SYSTEMS:   GENERAL: feels well otherwise  SKIN: denies any unusual skin lesions  EYES: denies blurred vision or double vision  HEENT: denies nasal congestion, sinus pain or ST  LUNGS: franky Abdomen:   Soft, non-tender, bowel sounds active all four quadrants,  no masses, no organomegaly   Pelvic: Deferred   Extremities: Extremities normal, atraumatic, no cyanosis or edema   Pulses: 2+ and symmetric   Skin: Skin color, texture, turgor normal, Future    Fibrocystic breast disease (FCBD), unspecified laterality    Other seborrheic keratosis    Symptomatic menopausal or female climacteric states    Cherry hemangioma    History of basal cell cancer    Medication management  -     COMP METABOLIC PAN orders  - temazepam 7.5 MG Oral Cap; TAKE 1 CAPSULE BY MOUTH EVERY DAY AT BEDTIME AS NEEDED FOR SLEEP  Dispense: 90 capsule; Refill: 1    Reinforced healthy diet, lifestyle, and exercise. Return in 6 months (on 10/23/2019).      Viktoriya Navas DO, 4/23/20 DENSITOMETRY (CPT=77080) 04/26/2018    No flowsheet data found. Pap and Pelvic      Pap: Every 3 yrs age 21-68 or Pap+HPV every 5 yrs age 33-67, age 72 and older at high risk There are no preventive care reminders to display for this patient.  Update Hea

## 2019-04-29 ENCOUNTER — HOSPITAL ENCOUNTER (OUTPATIENT)
Dept: MAMMOGRAPHY | Age: 69
Discharge: HOME | End: 2019-04-29
Attending: FAMILY MEDICINE
Payer: MEDICARE

## 2019-04-29 DIAGNOSIS — Z12.39 SCREENING FOR BREAST CANCER: ICD-10-CM

## 2019-04-29 PROCEDURE — 77063 BREAST TOMOSYNTHESIS BI: CPT | Performed by: FAMILY MEDICINE

## 2019-04-29 PROCEDURE — 77067 SCR MAMMO BI INCL CAD: CPT | Performed by: FAMILY MEDICINE

## 2019-05-17 ENCOUNTER — APPOINTMENT (OUTPATIENT)
Dept: GENERAL RADIOLOGY | Facility: HOSPITAL | Age: 69
DRG: 100 | End: 2019-05-17
Attending: INTERNAL MEDICINE
Payer: MEDICARE

## 2019-05-17 ENCOUNTER — APPOINTMENT (OUTPATIENT)
Dept: CT IMAGING | Facility: HOSPITAL | Age: 69
DRG: 100 | End: 2019-05-17
Attending: EMERGENCY MEDICINE
Payer: MEDICARE

## 2019-05-17 PROBLEM — E87.2 METABOLIC ACIDOSIS: Status: ACTIVE | Noted: 2019-05-17

## 2019-05-17 PROBLEM — E87.6 HYPOKALEMIA: Status: ACTIVE | Noted: 2019-05-17

## 2019-05-17 PROBLEM — Z86.59 MENTAL STATUS CHANGE RESOLVED: Status: ACTIVE | Noted: 2019-05-17

## 2019-05-17 PROBLEM — R56.9 SEIZURES (HCC): Status: ACTIVE | Noted: 2019-05-17

## 2019-05-17 PROBLEM — E87.20 METABOLIC ACIDOSIS: Status: ACTIVE | Noted: 2019-05-17

## 2019-05-17 PROBLEM — R73.9 HYPERGLYCEMIA: Status: ACTIVE | Noted: 2019-05-17

## 2019-05-17 PROCEDURE — 71045 X-RAY EXAM CHEST 1 VIEW: CPT | Performed by: INTERNAL MEDICINE

## 2019-05-17 PROCEDURE — 70450 CT HEAD/BRAIN W/O DYE: CPT | Performed by: EMERGENCY MEDICINE

## 2019-05-17 PROCEDURE — 72125 CT NECK SPINE W/O DYE: CPT | Performed by: EMERGENCY MEDICINE

## 2019-05-17 NOTE — PROGRESS NOTES
NURSING ADMISSION NOTE      Patient admitted via Cart  Oriented to room. Safety precautions initiated. Bed in low position. Call light in reach. Pt arrived to [facility] [facility] accompanied by . VSS, on RA.  Nonverbal, per notes and  that is her b

## 2019-05-17 NOTE — CONSULTS
Danielle Lyle is a 78year old female.    Patient presents with:  Seizure Disorder (neurologic)    HPI:    Cc LOC with shaking  HPI: pt has severe dementia and basically nonverbal at baseline, does have UE tremor   takes care of her at home  She d Relation Age of Onset   • Cancer Father         Prostate   • Hypertension Mother    • Fibromyalgia Sister          Megestrol               RASH          Allergies:    Megestrol               RASH   Current Meds:    No current outpatient medications on file caused a seizure, but if pt is going to have trouble taking meds in the future and refuse them periodically, remaining on an AED should be a consideration or consider changing to syrup.   Will follow    IK#[id number]

## 2019-05-17 NOTE — ED NOTES
Agitated, unable to redirect. Screaming, attempting to get off bed. Administered 5 mg of Haldol IM. Will continue to monitor.

## 2019-05-17 NOTE — PROGRESS NOTES
Pharmacy renal dose adjustment for metoclopramide (Reglan)    Imtiaz Benito has been prescribed metoclopramide 10 mg every 8 hours for nausea/vomiting. Estimated Creatinine Clearance: 33.4 mL/min (A) (based on SCr of 1.27 mg/dL (H)).     The estimate

## 2019-05-17 NOTE — ED INITIAL ASSESSMENT (HPI)
Arrives via [facility] after had seizure like activity at home while  was bathing her.  eased her to the floor. Combative, screaming. EMS administered 2.5 mg Versed IM. IV pulled out during transport d/t patient being combative.   Cont

## 2019-05-17 NOTE — H&P
UTE HOSPITALIST  History and Physical     Kierra Callahan Patient Status:  Observation    1950 MRN [id number]   [facility] 3NE-A Attending Kathleen Kaplan MD   Hosp Day # 0 PCP Antonio Tidwell DO     Chief Complaint: AMS    History Allergies:   Megestrol               RASH    Medications:    No current facility-administered medications on file prior to encounter. Current Outpatient Medications on File Prior to Encounter:  PARoxetine HCl 20 MG Oral Tab Take 20 mg by mouth daily. Labs:  Recent Labs   Lab 05/17/19  0852   WBC 11.0   HGB 13.7   .9*   .0   BAND 5       Recent Labs   Lab 05/17/19 0852   *   BUN 15   CREATSERUM 1.27*   GFRAA 49*   GFRNAA 43*   CA 8.8   ALB 3.7      K 3.2*      CO2 1

## 2019-05-17 NOTE — ED PROVIDER NOTES
Patient Seen in: [facility] Emergency Department    History   Patient presents with:  Seizure Disorder (neurologic)    Stated Complaint: AMS    HPI    Patient is a 24-year-old female who presents after possible seizure.   Her  who is her prima 05/17/19 0833 97.8 °F (36.6 °C)   Temp src 05/17/19 0833 Temporal   SpO2 05/17/19 0833 92 %   O2 Device 05/17/19 0826 None (Room air)       Current:/39 (BP Location: Right arm)   Pulse 75   Temp 98.1 °F (36.7 °C) (Axillary)   Resp 20   Ht 165.1 cm (5 .9 (*)     RDW-SD 46.9 (*)     All other components within normal limits   CBC WITH DIFFERENTIAL WITH PLATELET    Narrative: The following orders were created for panel order CBC WITH DIFFERENTIAL WITH PLATELET.   Procedure Admission  Date Reviewed: 4/23/2019          ICD-10-CM Noted POA    * (Principal) Mental status change resolved Z86.59 5/17/2019 Unknown    Hyperglycemia R73.9 5/17/2019 Yes    Hypokalemia E87.6 1/61/2473 Yes    Metabolic acidosis H37.7 2/17/8955 Yes    Se

## 2019-05-18 NOTE — CM/SW NOTE
05/18/19 1400   CM/SW Referral Data   Referral Source Physician   Reason for Referral Discharge planning   Informant Spouse; Children   Patient Info   Patient's Mental Status Memory Impairments   Patient Communication Issues Non-verbal   Patient's Home E recommendations/therapy assessments.     Ben Hill LCSW  pgr [phone]

## 2019-05-18 NOTE — PLAN OF CARE
A/O x self. Seizure precaution no seizure activity this shift. Patient has been afebrile today. Patient took medications whole in applesauce. She was to drowsy to eat breakfast but good appetite later in the day. Mary Ann bonilla removed this shift.   Natalie medications as ordered  - Monitor neurological status  Outcome: Progressing  Goal: Remains free of injury related to seizure activity  Description  INTERVENTIONS:  - Maintain airway, patient safety  and administer oxygen as ordered  - Monitor patient for s

## 2019-05-18 NOTE — PROGRESS NOTES
UTE HOSPITALIST  Progress Note     Nabil Kathryn Patient Status:  Observation    1950 MRN [id number]   [facility] 3NE-A Attending Shant Sena MD   Hosp Day # 0 PCP Eyad Lau DO     Chief Complaint: AMS    S: Patient febril 40 mg Subcutaneous Daily   • levETIRAcetam  250 mg Intravenous Q12H       ASSESSMENT / PLAN:     1. New onset seizures, started on Keppra, neurology following  2. SOLE, on IVF, resolved  3. Fever, CXR and UA wnl, BCx pending, ?related to #1  4.  Metabolic ac

## 2019-05-18 NOTE — PLAN OF CARE
A/O x self  Seizure precaution no seizure activity overnight- EEG planned for AM  Takes medications whole or crushed in applesauce  Restoril for sleep nightly PRN- pt's  stated she had missed a dose the night of seizure  Fever spiked overnight, MD garrison ordered  Outcome: Progressing  Goal: Absence of seizures  Description  INTERVENTIONS  - Monitor for seizure activity  - Administer anti-seizure medications as ordered  - Monitor neurological status  Outcome: Progressing  Goal: Remains free of injury relate

## 2019-05-19 NOTE — PLAN OF CARE
A/O x self. Seizure precaution no seizure activity this shift. Patient has been afebrile today. Patient more awake today and has a good appetite. She worked with pt and now up in chair. Family at bedside.   Patient saying few meaningful words per family Maintain airway, patient safety  and administer oxygen as ordered  - Monitor patient for seizure activity, document and report duration and description of seizure to MD/LIP  - If seizure occurs, turn patient to side and suction secretions as needed  - Reor

## 2019-05-19 NOTE — PROGRESS NOTES
[facility]  Neurology  Hospital Progress Report    Joshua Patino Patient Status:  Inpatient    1950 MRN [id number]   [facility] 3NE-A Attending Zachary Santo MD   Hosp Day # 0 PCP Fran Alejo DO   Date of Admission:   ABDOM HYSTERECTOMY  1982    w/removal of both ovaries     Family History  Family History   Problem Relation Age of Onset   • Cancer Father         Prostate   • Hypertension Mother    • Fibromyalgia Sister      Social History  Social History    Tobacco Use tablet by mouth daily. Multiple Vitamin Oral Tab Take 1 tablet by mouth daily.      Allergies    Megestrol               RASH  Review of Systems:   Review of Systems: Unable to fully obtain   Physical Exam:   Physical Exam:  /62 (BP Location: Minh subluxation mild at C4. No prevertebral swelling.     Dictated by: Beena Martinez MD on 5/17/2019 at 10:48     Approved by: Beena Martinez MD            Xr Chest Ap Portable  (cpt=71045)    Result Date: 5/17/2019  CONCLUSION:  Nothing acute is identified

## 2019-05-19 NOTE — PHYSICAL THERAPY NOTE
PHYSICAL THERAPY EVALUATION - INPATIENT     Room Number: 5840/2004-L  Evaluation Date: 5/19/2019  Type of Evaluation: Initial  Physician Order: PT Eval and Treat    Presenting Problem: Possibel CELIA  Reason for Therapy: Mobility Dysfunction and Dischar 16  Railing: Yes    Lives With: Spouse  Drives: No          Prior Level of [place]: per , pt is amb s any use of an AD, SBA/Mod I in her self care and mobilities at home.  She is a \"wanderer\", per soniather and goes out in the community with he CL    FUNCTIONAL ABILITY STATUS  Gait Assessment   Gait Assistance:  Moderate assistance  Distance (ft): 60x2  Assistive Device: Rolling walker  Pattern: Shuffle  Stoop/Curb Assistance: Not tested       Skilled Therapy Provided: In bed and is agreeable with patient presents with the following impairments.  Decrease stability when ambulating with pain and discomfort on her back/ R side affecting her functional mobilities, decrease cognition with inability to follow simple command consistently and verbalized her training;Balance training  Rehab Potential : Good  Frequency (Obs): 5x/week  Number of Visits to Meet Established Goals: 7      CURRENT GOALS    Goal #1 Patient is able to demonstrate supine - sit EOB @ level: supervision     Goal #2 Patient is able to [facility]

## 2019-05-19 NOTE — CM/SW NOTE
05/19/19 1200   Choice of HHC/SNF/HOSPICE   Informed of right to choose provider Yes   Patient/family choice Residential   Information given to Spouse   Residential HHC/Hospice financial disclosure given Yes   Pt refuses [facility] Yes     MSW spo

## 2019-05-19 NOTE — PLAN OF CARE
Pt is A/O to self. Pt opening eyes and seems to be less agitated. Afebrile, VSS  Keppra IV q 12. Seizure precautions no seizure activity noted. EEG planned for Monday  Temazepam for sleep.  Meds whole in applesauce  Order and feed, appetite good per family stable or improved neurological status  Description  INTERVENTIONS  - Assess for and report changes in neurological status  - Initiate measures to prevent increased intracranial pressure  - Maintain blood pressure and fluid volume within ordered parameters

## 2019-05-19 NOTE — PROGRESS NOTES
UTE HOSPITALIST  Progress Note     Aura Nava Patient Status:  Observation    1950 MRN [id number]   [facility] 3NE-A Attending Reuben Epperson MD   Hosp Day # 0 PCP Eduardo Turcios DO     Chief Complaint: AMS    S: Patient Karoline Pin Oral Q4H   • levETIRAcetam  250 mg Oral BID   • PARoxetine HCl  20 mg Oral Nightly   • aspirin  81 mg Oral Daily   • QUEtiapine Fumarate  25 mg Oral BID   • enoxaparin  40 mg Subcutaneous Daily       ASSESSMENT / PLAN:     1. New onset seizures, started on

## 2019-05-19 NOTE — HOME CARE LIAISON
Referral received from [facility]. Met with patient,  and daughter at the bedside. They are agreeable to [facility] services, RN/PT. Family informed me they would like a hospital bed at home.  I left a note to follow up in the morning and facilitate ordering

## 2019-05-20 NOTE — PROGRESS NOTES
UTE HOSPITALIST  Progress Note     Stacy Severe Patient Status:  Observation    1950 MRN [id number]   [facility] 3NE-A Attending Raad Chopra MD   Hosp Day # 1 PCP Dipika Tamayo DO     Chief Complaint: AMS    S: Patient Padmini Rodriguez input(s): PTP, INR in the last 168 hours. No results for input(s): TROP, CK in the last 168 hours. Imaging: Imaging data reviewed in Epic.     Medications:   • levETIRAcetam  250 mg Oral BID   • PARoxetine HCl  20 mg Oral Nightly   • aspirin  81

## 2019-05-20 NOTE — HOME CARE LIAISON
Left message with pt's , Norris regarding the hospital bed. Sent referral to [facility] due to patient's insurance. 1300: Received confirmation from Freedom Kadie at [employer] that the hospital bed was approved.  She stated that they will arrange delivery with pt's s

## 2019-05-20 NOTE — PLAN OF CARE
Resumed care at 1930  Pt alert and oriented to self only  VS WNL, RA  Seizure precautions  General diet, order and feed  Up with 1 assist and walker  Electrolyte protocol, covered 3.8 on pm shift, redraw in am  Incontinent  EEG in am  Bruising noted to rig description of seizure to MD/LIP  - If seizure occurs, turn patient to side and suction secretions as needed  - Reorient patient post seizure  - Seizure pads on all 4 side rails  - Instruct patient/family to notify RN of any seizure activity  - Instruct pa

## 2019-05-20 NOTE — PLAN OF CARE
Problem: NEUROLOGICAL - ADULT  Goal: Achieves stable or improved neurological status  Description  INTERVENTIONS  - Assess for and report changes in neurological status  - Initiate measures to prevent increased intracranial pressure  - Maintain blood pre Incontinent at times. Up with walker and 1 assist. Pt currently up in chair. Family at bedside. Will continue to monitor. Spouse verbalized hospital bed to be delivered to their home tomorrow. Dr Kristy Cronin notified.

## 2019-05-20 NOTE — CM/SW NOTE
Call to [address] I at [facility] regarding hospital bed delivery, available to deliver at 11pm tonight, spouse declines and will accept tomorrow 5/21. Will plan d/c tomorrow 5/21. [facility] made aware.     Yen Velasco RN,   Phone [phone]  Page

## 2019-05-21 NOTE — PROCEDURES
[facility], [address] [place] [address]      PATIENT'S NAME: Linettekate Langford   ATTENDING PHYSICIAN: Sil Clark M.D.    PATIENT ACCOUNT #: [de-identified] LOCATION: [address] [place] [address]   MEDICAL RECORD #: [id number] DATE OF BIRTH

## 2019-05-21 NOTE — PHYSICAL THERAPY NOTE
PHYSICAL THERAPY TREATMENT NOTE - INPATIENT    Room Number: 8823/8659-R     Session: 1   Number of Visits to Meet Established Goals: 7    Presenting Problem: Possibel SZ    Problem List  Principal Problem:    Mental status change resolved  Active Problems Turning over in bed (including adjusting bedclothes, sheets and blankets)?: A Little   -   Sitting down on and standing up from a chair with arms (e.g., wheelchair, bedside commode, etc.): A Lot   -   Moving from lying on back to sitting on the side of the session/findings; All patient questions and concerns addressed; Family present    ASSESSMENT   Pt continues to present with impaired strength , endurance and balance below PLOF and will continue to benefit from ongoing IP PT to maximize functional independen

## 2019-05-21 NOTE — PLAN OF CARE
Resumed care at 1930  Pt A&O to self only  VS WNL, RA  Sz precautions, on oral keppra  Lovenox for VTE  Electrolyte protocol  Incontinent  Up with 1 assist and walker  Bruising noted to bilateral great toes  SL to left FA  POC: per notes pt to d/c home tod turn patient to side and suction secretions as needed  - Reorient patient post seizure  - Seizure pads on all 4 side rails  - Instruct patient/family to notify RN of any seizure activity  - Instruct patient/family to call for assistance with activity based

## 2019-05-21 NOTE — PROGRESS NOTES
NURSING DISCHARGE NOTE    Discharged Home via Wheelchair. Accompanied by Spouse and Support staff  Belongings Taken by patient/family. Patient discharged home with . Hospital bed to be delivered to pts home.  Medication list and discharge instr

## 2019-05-21 NOTE — PAYOR COMM NOTE
ASKING FOR RECONSIDERATION OF INPT  PT STARTED AS OBS ON 5/17, MADE INPT ON 5/19.      CONTINUED STAY REVIEW    [facility]  Subscriber #:  [id number]  Authorization Number: [id number]    Admit date: 5/19/19  Admit time: 0    Admitting Physician: General: No acute distress. Respiratory: Clear to auscultation bilaterally. No wheezes. No rhonchi. Cardiovascular: S1, S2. Regular rate and rhythm. No murmurs, rubs or gallops. Abdomen: Soft, nontender, nondistended. Positive bowel sounds.  No rebo DNR  · Malik: no  · Central line: no     Will the patient be referred to TCC on discharge?: no  Estimated date of discharge: 5/20  Discharge is dependent on: progress  At this point Ms. Benson is expected to be discharge to: home    SW NOTE    05/19/19 1 testing during this hospitalization. Dr. Sarah Knox in her notes felt the patient would benefit from additional care. As to the \"parkinsonism\" the patient has a profound neurodegenerative disease, she has both motor and cognitive dysfunction.   I will hol Lab 05/17/19  0852 05/18/19  0633 05/19/19  0606 05/19/19  1755 05/20/19  0745   * 85  --   --   --    BUN 15 11  --   --   --    CREATSERUM 1.27* 0.81  --   --   --    GFRAA 49* 85  --   --   --    GFRNAA 43* 74  --   --   --    CA 8.8 8.1*  -- d/c.     5/21 HOSPITALIST NOTE  Hosp Day # 2 PCP Viktoriya Navas DO      Chief Complaint: AMS     S: Patient doing better, MS much improved.     Review of Systems:   Unable to obtain     Vital signs:  Temp:  [97.8 °F (36.6 °C)-98.5 °F (36.9 °C)] 97.8 °F (36. reviewed in Epic.     Medications:   • levETIRAcetam  250 mg Oral BID   • PARoxetine HCl  20 mg Oral Nightly   • aspirin  81 mg Oral Daily   • QUEtiapine Fumarate  25 mg Oral BID   • enoxaparin  40 mg Subcutaneous Daily         ASSESSMENT / PLAN:      1.  Yg Guerrero

## 2019-05-21 NOTE — PROGRESS NOTES
UTE HOSPITALIST  Progress Note     Richa Cohen Patient Status:  Observation    1950 MRN [id number]   [facility] 3NE-A Attending Any Salmon MD   Hosp Day # 2 PCP Nadja Chanel DO     Chief Complaint: AMS    S: Patient doing PTP, INR in the last 168 hours. No results for input(s): TROP, CK in the last 168 hours. Imaging: Imaging data reviewed in Epic.     Medications:   • levETIRAcetam  250 mg Oral BID   • PARoxetine HCl  20 mg Oral Nightly   • aspirin  81 mg Oral Da

## 2019-05-21 NOTE — CM/SW NOTE
05/21/19 1005   Discharge disposition   Expected discharge disposition Home-Health   Name of Facillity/Home Care/Hospice Residential   HME provider [address]  (hospital bed)       [facility] paged that patient is clear for d/c.     / to rem

## 2019-05-22 ENCOUNTER — PATIENT OUTREACH (OUTPATIENT)
Dept: CASE MANAGEMENT | Age: 69
End: 2019-05-22

## 2019-05-22 DIAGNOSIS — Z02.9 ENCOUNTERS FOR UNSPECIFIED ADMINISTRATIVE PURPOSE: ICD-10-CM

## 2019-05-22 NOTE — PROGRESS NOTES
Initial Post Discharge Follow Up   Discharge Date: 5/21/19  Contact Date: 5/22/2019    Consent Verification:  Assessment Completed With: Brit Turcios  HIPAA Verified? Yes    Discharge Dx:    Mental Status Change, dementia, SOLE    General:   • How hav

## 2019-05-22 NOTE — PROGRESS NOTES
NCM had poor connection with pt spouse and asked NCM to call back. NCM called back however no answer. Will try again later.

## 2019-05-22 NOTE — PAYOR COMM NOTE
--------------  DISCHARGE REVIEW    Jarad Keller MA O  Subscriber #:  [id number]  Authorization Number: [id number]    Admit date: 5/19/19  Admit time:  0  Discharge Date: 5/21/2019  1:23 PM     Admitting Physician: Stevie Hunt MD  Attending Bere Jarvis

## 2019-05-22 NOTE — PROGRESS NOTES
NCM called and left  requesting a call back as pt spouse had poor connection with [facility] on earlier call today for TCM.   NCM to confirm with pt spouse TCM HFU appt with [facility]CC on 5/29/19 8:00am.

## 2019-05-23 NOTE — PROGRESS NOTES
Initial Post Discharge Follow Up   Discharge Date: 5/21/19  Contact Date: 5/22/2019    Consent Verification:  Assessment Completed With: Awilda Covert   HIPAA Verified? Yes    Discharge Dx:    Mental Status Change, dementia    General:   • How have yo Cholecalciferol (VITAMIN D) 1000 UNITS Oral Tab Take 2 tablets by mouth daily. Disp:  Rfl:    Calcium Carbonate-Vitamin D (CALCIUM-D OR) Take 1 tablet by mouth daily. Disp:  Rfl:    Multiple Vitamin Oral Tab Take 1 tablet by mouth daily.  Disp:  Rfl: [facility])        Oct 25, 2019 10:30 AM CDT Exam - Established Patient with Massie Councilman, DO [address], Dougie Lowe ([address] [facility])            [address], 0 [facility]

## 2019-05-23 NOTE — DISCHARGE SUMMARY
UTE HOSPITALIST  DISCHARGE SUMMARY     Bayron Lundberg Patient Status:  Inpatient    1950 MRN [id number]   [facility] 3NE-A Attending Jigna [facility] Day # 2 PCP Loni Blanchard DO     Date of Admission: 2019  D taking these medications      Instructions Prescription details   levETIRAcetam 250 MG Tabs  Commonly known as:  KEPPRA      Take 1 tablet (250 mg total) by mouth 2 (two) times daily.    Quantity:  60 tablet  Refills:  0        CONTINUE taking these medicat Dahiana Pretty NP    Patient Instructions:                          Vital signs:       Physical Exam:    General: No acute distress. Respiratory: Clear to auscultation bilaterally. No wheezes. No rhonchi.   Cardiovascular: S1, S2. Regular rate and rhyth

## 2019-05-24 NOTE — TELEPHONE ENCOUNTER
Referral to Daniel Brunson ([address])    Reason for the order/referral: Alzheimer's dementia without behavioral disturbance, unspecified timing of dementia onset   PCP: ROSY   Refer to Provider: Bronson Conner

## 2019-05-28 ENCOUNTER — HOSPITAL ENCOUNTER (OUTPATIENT)
Dept: GENERAL RADIOLOGY | Facility: HOSPITAL | Age: 69
Discharge: HOME | End: 2019-05-28
Attending: CLINICAL NURSE SPECIALIST
Payer: MEDICARE

## 2019-05-28 ENCOUNTER — OFFICE VISIT (OUTPATIENT)
Dept: INTERNAL MEDICINE CLINIC | Facility: CLINIC | Age: 69
End: 2019-05-28
Payer: MEDICARE

## 2019-05-28 VITALS
HEIGHT: 65 IN | HEART RATE: 58 BPM | BODY MASS INDEX: 18.83 KG/M2 | WEIGHT: 113 LBS | RESPIRATION RATE: 16 BRPM | DIASTOLIC BLOOD PRESSURE: 72 MMHG | OXYGEN SATURATION: 98 % | SYSTOLIC BLOOD PRESSURE: 110 MMHG | TEMPERATURE: 97 F

## 2019-05-28 DIAGNOSIS — M54.50 ACUTE BILATERAL LOW BACK PAIN WITHOUT SCIATICA: ICD-10-CM

## 2019-05-28 DIAGNOSIS — G30.1 LATE ONSET ALZHEIMER'S DISEASE WITH BEHAVIORAL DISTURBANCE (HCC): ICD-10-CM

## 2019-05-28 DIAGNOSIS — Z09 HOSPITAL DISCHARGE FOLLOW-UP: ICD-10-CM

## 2019-05-28 DIAGNOSIS — E78.00 PURE HYPERCHOLESTEROLEMIA: ICD-10-CM

## 2019-05-28 DIAGNOSIS — F02.81 LATE ONSET ALZHEIMER'S DISEASE WITH BEHAVIORAL DISTURBANCE (HCC): ICD-10-CM

## 2019-05-28 DIAGNOSIS — K59.09 OTHER CONSTIPATION: ICD-10-CM

## 2019-05-28 DIAGNOSIS — R56.9 SEIZURES (HCC): Primary | ICD-10-CM

## 2019-05-28 DIAGNOSIS — M81.0 AGE-RELATED OSTEOPOROSIS WITHOUT CURRENT PATHOLOGICAL FRACTURE: ICD-10-CM

## 2019-05-28 PROCEDURE — 99495 TRANSJ CARE MGMT MOD F2F 14D: CPT | Performed by: CLINICAL NURSE SPECIALIST

## 2019-05-28 PROCEDURE — 72220 X-RAY EXAM SACRUM TAILBONE: CPT | Performed by: CLINICAL NURSE SPECIALIST

## 2019-05-28 PROCEDURE — 72110 X-RAY EXAM L-2 SPINE 4/>VWS: CPT | Performed by: CLINICAL NURSE SPECIALIST

## 2019-05-28 PROCEDURE — 1111F DSCHRG MED/CURRENT MED MERGE: CPT | Performed by: CLINICAL NURSE SPECIALIST

## 2019-05-28 RX ORDER — IBUPROFEN 200 MG
200 TABLET ORAL EVERY 8 HOURS PRN
COMMUNITY

## 2019-05-28 RX ORDER — ALPRAZOLAM 0.25 MG/1
0.25 TABLET ORAL 2 TIMES DAILY PRN
COMMUNITY
End: 2019-06-13

## 2019-05-28 RX ORDER — LIDOCAINE 4 G/G
PATCH TOPICAL
Qty: 7 PATCH | Refills: 0 | Status: SHIPPED | OUTPATIENT
Start: 2019-05-28 | End: 2019-07-11

## 2019-05-28 RX ORDER — AMOXICILLIN 250 MG
1 CAPSULE ORAL 2 TIMES DAILY
Qty: 1 TABLET | Refills: 0 | Status: SHIPPED | OUTPATIENT
Start: 2019-05-28

## 2019-05-28 NOTE — PROGRESS NOTES
[address]  TRANSITIONAL CARE CLINIC      HISTORY   CHIEF COMPLAINT: post hospital follow up visit  HPI: Yuni Lynch is a 78year old female here today for follow up after being hospitalized for possible seizure, change in mental stat 25 MG Oral Tab Take 25 mg by mouth 2 (two) times daily. Disp:  Rfl:    temazepam 7.5 MG Oral Cap Take 7.5 mg by mouth nightly as needed for Sleep. Disp:  Rfl:    alendronate 70 MG Oral Tab Take 70 mg by mouth once a week.  Disp:  Rfl:    aspirin 81 MG Oral process. Dictated by: Kings Ferreira MD on 5/17/2019 at 10:47     Approved by: Kings Ferreira MD            Ct Spine Cervical (MGO=[id number])    Result Date: 5/17/2019  CONCLUSION:  Motion artifact. Degenerative changes in the spine.   No fracture or traum reports \"bump\" to lower spine   RESPIRATORY: denies cough, denies dyspnea with exertion  CARDIOVASCULAR: denies syncope, chest pain   GI: denies abdominal pain, reports constipation    MUSCULOSKELETAL: reports pain to the lower back, sacrum and coccyx, daily. Disp: 1 tablet Rfl: 0   levETIRAcetam 250 MG Oral Tab Take 1 tablet (250 mg total) by mouth 2 (two) times daily. Disp: 60 tablet Rfl: 0   PARoxetine HCl 20 MG Oral Tab Take 20 mg by mouth every evening.    Disp:  Rfl:    QUEtiapine Fumarate 25 MG Ora Reconciliation:  I am aware of an inpatient discharge within the last 30 days. The discharge medication list has been reconciled with the patient's current medication list and reviewed by me.   See medication list for additions of new medication, and atwood

## 2019-05-28 NOTE — PROGRESS NOTES
TRANSITIONAL CARE CLINIC PHARMACIST MEDICATION RECONCILIATION        Susanna King MRN [id number]    1950 PCP Viktoriya Roberts DO       Comments: Medication history completed in [address] [place] [address] by pharmacist with spouse/caregiver.     The f also reports giving her Xanax 0.25 mg as needed for agitation throughout the day. This is an old prescription filled about 2 years ago. Spouse also reports he has been giving her ibuprofen as needed for back pain.       Reviewed all medications in detail wi

## 2019-05-30 ENCOUNTER — TELEPHONE (OUTPATIENT)
Dept: FAMILY MEDICINE CLINIC | Facility: CLINIC | Age: 69
End: 2019-05-30

## 2019-05-30 NOTE — TELEPHONE ENCOUNTER
Eugenie Tavarez RN from [facility] home health  [phone]    Asking if you would sign off on home health/PT orders for pt. Recently in ED for seizures. Pt seeing neuro next week. RN reports they ask pcp for home health orders though. Please advise thanks.

## 2019-06-05 ENCOUNTER — OFFICE VISIT (OUTPATIENT)
Dept: INTERNAL MEDICINE CLINIC | Facility: CLINIC | Age: 69
End: 2019-06-05
Payer: MEDICARE

## 2019-06-05 VITALS
HEIGHT: 64 IN | SYSTOLIC BLOOD PRESSURE: 130 MMHG | DIASTOLIC BLOOD PRESSURE: 80 MMHG | HEART RATE: 88 BPM | BODY MASS INDEX: 19.29 KG/M2 | RESPIRATION RATE: 16 BRPM | WEIGHT: 113 LBS | OXYGEN SATURATION: 97 %

## 2019-06-05 DIAGNOSIS — E78.00 PURE HYPERCHOLESTEROLEMIA: ICD-10-CM

## 2019-06-05 DIAGNOSIS — K59.09 OTHER CONSTIPATION: ICD-10-CM

## 2019-06-05 DIAGNOSIS — R56.9 SEIZURES (HCC): Primary | ICD-10-CM

## 2019-06-05 DIAGNOSIS — S32.010D COMPRESSION FRACTURE OF L1 VERTEBRA WITH ROUTINE HEALING: ICD-10-CM

## 2019-06-05 DIAGNOSIS — M81.0 AGE-RELATED OSTEOPOROSIS WITHOUT CURRENT PATHOLOGICAL FRACTURE: ICD-10-CM

## 2019-06-05 DIAGNOSIS — G30.1 LATE ONSET ALZHEIMER'S DISEASE WITH BEHAVIORAL DISTURBANCE (HCC): ICD-10-CM

## 2019-06-05 DIAGNOSIS — F02.81 LATE ONSET ALZHEIMER'S DISEASE WITH BEHAVIORAL DISTURBANCE (HCC): ICD-10-CM

## 2019-06-05 PROCEDURE — 99213 OFFICE O/P EST LOW 20 MIN: CPT | Performed by: CLINICAL NURSE SPECIALIST

## 2019-06-05 NOTE — PROGRESS NOTES
TRANSITIONAL CARE CLINIC FOLLOW-UP VISIT     Imtiaz Benito MRN [id number]    1950 PCP Viktoriya Navas DO     CHIEF COMPLAINT: follow up visit - per spouse \"the lidoderm patch is really helping. \"  HPI: Ms. Westley Martin is here today for a follow Sleep. Disp:  Rfl:    alendronate 70 MG Oral Tab Take 70 mg by mouth once a week. Disp:  Rfl:    aspirin 81 MG Oral Tab Take 81 mg by mouth daily. Disp:  Rfl:    Cholecalciferol (VITAMIN D) 1000 UNITS Oral Tab Take 2 tablets by mouth daily.  Disp:  Rfl: ([address]  · Management per Neuro  · Keppra evening dose recently increased; patient is tolerating  Late onset Alzheimer's disease with behavioral disturbance  · CPM with paroxetine, quetiapine, temazepam  Pure hypercholesterolemia  Age-related osteoporosis wi PCP Visit:  7/2/19  3.   Neurology: 9/5/19      SHAZIA Kennedy, 6/5/2019   [facility]

## 2019-06-07 ENCOUNTER — TELEPHONE (OUTPATIENT)
Dept: FAMILY MEDICINE CLINIC | Facility: CLINIC | Age: 69
End: 2019-06-07

## 2019-06-07 NOTE — TELEPHONE ENCOUNTER
Call from [address] [facility]/residential home health-sts calling to update dr Eric Arroyo:  1. Pt reports fall on 6/5-witnessed by spouse, no LOC, just lost her balance and fell hitting right right side on floor.        Has slight bruise on right elbow, no other appar

## 2019-06-14 ENCOUNTER — MED REC SCAN ONLY (OUTPATIENT)
Dept: FAMILY MEDICINE CLINIC | Facility: CLINIC | Age: 69
End: 2019-06-14

## 2019-07-02 NOTE — PROGRESS NOTES
Ronald Tse is a 78year old female. HPI:   Pt. Is here for follow up from the TCM. Felecia York May 17, 2019. Was in from Friday to Tuesday. Did [facility]. She is done with that. Seeing Dr. Sarah Knox. On keppra for seizures. She is not sleeping.   Sleeps 20 disease    • Disorder of bone and cartilage, unspecified    • Falls    • Molluscum contagiosum    • Mycosis fungoides, unspecified site, extranodal and solid organ sites    • Other and unspecified hyperlipidemia    • Other nonspecific abnormal finding    • encounter diagnosis)  Insomnia, unspecified type  History of recent fall  Bruising  Age-related osteoporosis without current pathological fracture  Medication management    No orders of the defined types were placed in this encounter.       Meds & Refills f

## 2019-07-03 ENCOUNTER — MED REC SCAN ONLY (OUTPATIENT)
Dept: FAMILY MEDICINE CLINIC | Facility: CLINIC | Age: 69
End: 2019-07-03

## 2019-07-11 ENCOUNTER — HOSPITAL ENCOUNTER (OUTPATIENT)
Age: 69
Discharge: HOME | End: 2019-07-11
Attending: FAMILY MEDICINE
Payer: MEDICARE

## 2019-07-11 VITALS
DIASTOLIC BLOOD PRESSURE: 66 MMHG | HEART RATE: 84 BPM | TEMPERATURE: 98 F | RESPIRATION RATE: 16 BRPM | OXYGEN SATURATION: 95 % | SYSTOLIC BLOOD PRESSURE: 110 MMHG

## 2019-07-11 DIAGNOSIS — L50.9 URTICARIAL RASH: Primary | ICD-10-CM

## 2019-07-11 PROCEDURE — 99213 OFFICE O/P EST LOW 20 MIN: CPT

## 2019-07-11 PROCEDURE — 99214 OFFICE O/P EST MOD 30 MIN: CPT

## 2019-07-11 RX ORDER — PREDNISONE 20 MG/1
40 TABLET ORAL 2 TIMES DAILY
Qty: 20 TABLET | Refills: 0 | Status: SHIPPED | OUTPATIENT
Start: 2019-07-11 | End: 2019-07-16

## 2019-07-11 NOTE — ED INITIAL ASSESSMENT (HPI)
The patient is here for evaluation of a rash to bilateral forearms, face, and to her neck x 2 days and has progressively getting worse. She also has left eye swelling.   Her  states he used a zinc diaper rash cream that was helpful for a short time

## 2019-07-12 NOTE — ED PROVIDER NOTES
Patient Seen in: [facility]    History   Patient presents with:  Rash Skin Problem (integumentary)    Stated Complaint: left eye swelling and  rash on left wrist x 1 day     HPI    78year old female with history of Alzheim swelling and  rash on left wrist x 1 day   Other systems are as noted in HPI. Constitutional and vital signs reviewed. All other systems reviewed and negative except as noted above.     Physical Exam     ED Triage Vitals   BP 07/11/19 1823 110/66   [address]  [phone]    In 2 days  If symptoms worsen        Medications Prescribed:  Discharge Medication List as of 7/11/2019  6:40 PM    START taking these medications    predniSONE 20 MG Oral Tab  Take 2 tablets (40 mg total) by mouth

## 2019-07-19 ENCOUNTER — TELEPHONE (OUTPATIENT)
Dept: FAMILY MEDICINE CLINIC | Facility: CLINIC | Age: 69
End: 2019-07-19

## 2019-07-19 NOTE — TELEPHONE ENCOUNTER
Wean off quetiapine and take an antihistamine twice daily (2 separate antihistamines) for the itching.   Follow-up with Dr. Denis Newell from neurology

## 2019-07-19 NOTE — TELEPHONE ENCOUNTER
I spoke with Sayda Daughters. Swati Chun is taking the quetiapine 50 mg bid. The rash started one week after her dose was increased from 25 mg bis to 50 mg bid.  Please advise

## 2019-07-19 NOTE — TELEPHONE ENCOUNTER
I spoke with Clifford Childress, reports thiago has been taking the lower dose for a while, the rash started 1 week after the dose increase, went to UC, prescribed Prednisone, stopped after two because Ellis Garcia became more agitated, still has rash on face and arms.   Denies

## 2019-07-19 NOTE — TELEPHONE ENCOUNTER
I spoke with Dr. Santiago Garnica. She advised pt to take OTC claritin 1 in the am and 1 zyrtec in the pm until rash resolves and itching stops.  Pt is to wean off of quetiapine as follows:  Quetiapine 25 mg 1 po twice daily for 2 days, then 25 mg 1 daily x 1 day, t

## 2019-07-31 NOTE — PROGRESS NOTES
Daniel Villaseñor is a 78year old female. HPI:   Pt. Is here for follow up. Had allergy to quetiapine when dose increased. Started risperidone 3 times a day about 1 week ago. He notes tht she is quieter. Tomi Beard is looking into memory care facilities.  Te abnormal finding    • Pelvic fracture (HCC) 01/01/2018   • Sebaceous cyst    • Seizure disorder (HCC)    • Symptomatic menopausal or female climacteric states    • Tremor       Social History:  Social History    Tobacco Use      Smoking status: Never Smoke defined types were placed in this encounter.       Meds & Refills for this Visit:  Requested Prescriptions      No prescriptions requested or ordered in this encounter       Imaging & Consults:  None   Advised to speak with neurology regarding her medicatio

## 2019-08-02 ENCOUNTER — TELEPHONE (OUTPATIENT)
Dept: FAMILY MEDICINE CLINIC | Facility: CLINIC | Age: 69
End: 2019-08-02

## 2019-08-02 DIAGNOSIS — Z11.1 SCREENING FOR TUBERCULOSIS: Primary | ICD-10-CM

## 2019-08-02 NOTE — TELEPHONE ENCOUNTER
aware of order placed. Asks if [facility] will draw on sat? I offered to call and call him back. Tc to [facility]. They do not draw on weekends. I have let him know. He agrees to have pt draw here on Monday.

## 2019-08-02 NOTE — TELEPHONE ENCOUNTER
S/w Luciana RN from hospice as we just sent over eval for this pt. She said pt will most likely be admitted to [address] [place] [address] and their facility will require her to have a neg TB test.    OK to order?  She thinks [facility] may be able to do the t

## 2019-08-02 NOTE — TELEPHONE ENCOUNTER
Patient's  calling for lab order for quantiferon test. Patient is to be admitted to a skilled nursing facility soon and they need this done. Please call patient's . Thank you.

## 2019-08-02 NOTE — TELEPHONE ENCOUNTER
Luciana JEWELL from [facility]. Reports she had conversation with  and he agrees hospice evaluation would be a good idea. Asking for order to [facility] eval and admit. Also asking for last few ov notes.   Asking to fax to [phone]

## 2019-08-03 ENCOUNTER — APPOINTMENT (OUTPATIENT)
Dept: LAB | Facility: HOSPITAL | Age: 69
End: 2019-08-03
Attending: FAMILY MEDICINE
Payer: MEDICARE

## 2019-08-03 PROCEDURE — 86480 TB TEST CELL IMMUN MEASURE: CPT | Performed by: FAMILY MEDICINE

## 2019-08-03 PROCEDURE — 36415 COLL VENOUS BLD VENIPUNCTURE: CPT | Performed by: FAMILY MEDICINE

## 2019-08-06 LAB
M TB TUBERC IFN-G BLD QL: NEGATIVE
M TB TUBERC IFN-G/MITOGEN IGNF BLD: 0.03 IU/ML
M TB TUBERC IFN-G/MITOGEN IGNF BLD: 0.03 IU/ML
M TB TUBERC IGNF/MITOGEN IGNF CONTROL: >10 IU/ML
MITOGEN IGNF BCKGRD COR BLD-ACNC: 0.03 IU/ML

## 2019-08-07 ENCOUNTER — MED REC SCAN ONLY (OUTPATIENT)
Dept: FAMILY MEDICINE CLINIC | Facility: CLINIC | Age: 69
End: 2019-08-07

## 2019-09-19 ENCOUNTER — PATIENT OUTREACH (OUTPATIENT)
Dept: CASE MANAGEMENT | Age: 69
End: 2019-09-19

## 2020-01-17 ENCOUNTER — TELEPHONE (OUTPATIENT)
Dept: FAMILY MEDICINE CLINIC | Facility: CLINIC | Age: 70
End: 2020-01-17

## 2020-01-17 NOTE — TELEPHONE ENCOUNTER
Please call pt to schedule their annual MA supervisit. Please let Scott Ho know the date once it is schedule.   Thanks

## 2021-03-23 ENCOUNTER — MED REC SCAN ONLY (OUTPATIENT)
Dept: FAMILY MEDICINE CLINIC | Facility: CLINIC | Age: 71
End: 2021-03-23

## 2021-08-04 NOTE — TELEPHONE ENCOUNTER
Left voicemail for patient to call back to schedule this appointment. Sent unable to reach letter via [employer]

## 2021-12-17 ENCOUNTER — MED REC SCAN ONLY (OUTPATIENT)
Dept: FAMILY MEDICINE CLINIC | Facility: CLINIC | Age: 71
End: 2021-12-17

## 2022-01-27 ENCOUNTER — TELEPHONE (OUTPATIENT)
Dept: FAMILY MEDICINE CLINIC | Facility: CLINIC | Age: 72
End: 2022-01-27

## 2022-02-09 ENCOUNTER — TELEPHONE (OUTPATIENT)
Dept: FAMILY MEDICINE CLINIC | Facility: CLINIC | Age: 72
End: 2022-02-09

## 2022-03-07 ENCOUNTER — TELEPHONE (OUTPATIENT)
Dept: FAMILY MEDICINE CLINIC | Facility: CLINIC | Age: 72
End: 2022-03-07

## 2022-03-07 NOTE — TELEPHONE ENCOUNTER
I was going to call pt for MA supervisit. Howverver I saw this note from pt son on 1/24/2020. Dr Tierra Jeong is [address] [place] [address] at [facility] in [facility]. As such she is unable to go to your office for medical care. She is confined to a [facility]. She does not walk and is unable to speak coherently. However, she has had a flu shot.    Thank you  Vickie Douglas  [phone] ( cell)

## 2023-03-08 ENCOUNTER — MED REC SCAN ONLY (OUTPATIENT)
Dept: FAMILY MEDICINE CLINIC | Facility: CLINIC | Age: 73
End: 2023-03-08

## 2023-04-03 ENCOUNTER — MED REC SCAN ONLY (OUTPATIENT)
Dept: FAMILY MEDICINE CLINIC | Facility: CLINIC | Age: 73
End: 2023-04-03

## 2023-11-13 ENCOUNTER — MED REC SCAN ONLY (OUTPATIENT)
Dept: FAMILY MEDICINE CLINIC | Facility: CLINIC | Age: 73
End: 2023-11-13

## 2023-12-13 NOTE — TELEPHONE ENCOUNTER
From: Richa Cohen  To: Daren Munoz DO  Sent: 6/22/2017 6:51 PM CDT  Subject: Other    Hi Dr Joesph Paget sleeps all thru the night after taking a Lorazepam 7.5 mg.   Even after taking the two other pills around 6 PM she is extremely agitated, wri [MRI] : MRI [Right] : of the right [Knee] : knee [I independently reviewed and interpreted images and report] : I independently reviewed and interpreted images and report

## (undated) NOTE — Clinical Note
2017      To Whom It May Concern,    Dawna Palmer (: 1950) suffers from Alzheimer's dementia. If you have any further questions, you may reach me at [phone].       Sincerely,          Dr. Tamika Campos

## (undated) NOTE — MR AVS SNAPSHOT
[facility], [address] [address] [id number]               Thank you for choosing us for your health care visit with Bettie Henriquez DO.   We are glad to serve you and happy to provide you with this summary Instructions: To schedule an appointment for your radiology test please call [facility] Scheduling at [phone].          Referral Details     Referred By    Referred To    DO Ranulfo Washington [phone]   Phone:  6 Alendronate Sodium 70 MG Tabs   TAKE 1 TABLET(70 MG) BY MOUTH 1 TIME A WEEK   Commonly known as:  FOSAMAX           aspirin 81 MG Tabs   Take 81 mg by mouth daily. CALCIUM-D OR   Take  by mouth. 1 daily           COCONUT OIL OR   1 tablet daily. Weight Reduction Maintain normal body weight (body mass index 18.5-24.9 kg/m2)   DASH eating plan Adopt a diet rich in fruits, vegetables, and low fat dairy products with reduced content of saturated and total fat.    Dietary sodium reduction Reduce dietary

## (undated) NOTE — LETTER
Your patient was recently seen at the [facility] for a hospital follow-up visit. The visit note is attached. Please contact the clinic with any questions at [phone].     Thank you,  SHAZIA Ugarte

## (undated) NOTE — LETTER
Your patient was recently seen at the [facility] for a hospital follow-up visit. The visit note is attached. Please contact the clinic with any questions at [phone].     Thank you,  SHAZIA Mitchell

## (undated) NOTE — MR AVS SNAPSHOT
After Visit Summary   4/18/2017    Yuni Lynch    MRN: [id number]           Visit Information        Provider Department Dept Phone    4/18/2017 10:45 AM Tonya Howell DO [facility] [address] [place] [phone]      Your Vitals Were     BP Pulse Temp(Src) 4/25/2017 9:40 AM HOB [facility] [place] at [facility]    7/10/2017 11:00 AM Burak Najera [facility], [facility], [place]      Follow-up Instructions     Return in about 3 months (around 7/18/2017).       Imaging Scheduling Instruct